# Patient Record
Sex: MALE | Race: WHITE | NOT HISPANIC OR LATINO | Employment: OTHER | URBAN - METROPOLITAN AREA
[De-identification: names, ages, dates, MRNs, and addresses within clinical notes are randomized per-mention and may not be internally consistent; named-entity substitution may affect disease eponyms.]

---

## 2022-04-10 ENCOUNTER — APPOINTMENT (OUTPATIENT)
Dept: CT IMAGING | Facility: HOSPITAL | Age: 27
End: 2022-04-10

## 2022-04-10 ENCOUNTER — HOSPITAL ENCOUNTER (EMERGENCY)
Facility: HOSPITAL | Age: 27
Discharge: HOME | End: 2022-04-10
Attending: EMERGENCY MEDICINE | Admitting: EMERGENCY MEDICINE

## 2022-04-10 VITALS
WEIGHT: 300 LBS | RESPIRATION RATE: 20 BRPM | TEMPERATURE: 97.7 F | SYSTOLIC BLOOD PRESSURE: 158 MMHG | BODY MASS INDEX: 38.5 KG/M2 | DIASTOLIC BLOOD PRESSURE: 94 MMHG | HEART RATE: 105 BPM | OXYGEN SATURATION: 97 % | HEIGHT: 74 IN

## 2022-04-10 DIAGNOSIS — S06.0X0A CONCUSSION WITHOUT LOSS OF CONSCIOUSNESS, INITIAL ENCOUNTER: Primary | ICD-10-CM
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
## 2022-04-10 DIAGNOSIS — M54.2 ACUTE NECK PAIN: ICD-10-CM

## 2022-04-10 DIAGNOSIS — R10.9 ABDOMINAL PAIN, UNSPECIFIED ABDOMINAL LOCATION: ICD-10-CM

## 2022-04-10 DIAGNOSIS — R07.89 CHEST WALL PAIN: ICD-10-CM

## 2022-04-10 PROCEDURE — 72125 CT NECK SPINE W/O DYE: CPT

## 2022-04-10 PROCEDURE — 96374 THER/PROPH/DIAG INJ IV PUSH: CPT

## 2022-04-10 PROCEDURE — 70450 CT HEAD/BRAIN W/O DYE: CPT

## 2022-04-10 PROCEDURE — 74177 CT ABD & PELVIS W/CONTRAST: CPT

## 2022-04-10 PROCEDURE — 71260 CT THORAX DX C+: CPT

## 2022-04-10 PROCEDURE — 25010000002 IOPAMIDOL 61 % SOLUTION: Performed by: EMERGENCY MEDICINE

## 2022-04-10 PROCEDURE — 99283 EMERGENCY DEPT VISIT LOW MDM: CPT

## 2022-04-10 PROCEDURE — 25010000002 KETOROLAC TROMETHAMINE PER 15 MG: Performed by: EMERGENCY MEDICINE

## 2022-04-10 RX ORDER — KETOROLAC TROMETHAMINE 15 MG/ML
15 INJECTION, SOLUTION INTRAMUSCULAR; INTRAVENOUS ONCE
Status: COMPLETED | OUTPATIENT
Start: 2022-04-10 | End: 2022-04-10

## 2022-04-10 RX ORDER — SODIUM CHLORIDE 0.9 % (FLUSH) 0.9 %
10 SYRINGE (ML) INJECTION AS NEEDED
Status: DISCONTINUED | OUTPATIENT
Start: 2022-04-10 | End: 2022-04-10 | Stop reason: HOSPADM

## 2022-04-10 RX ORDER — HYDROCODONE BITARTRATE AND ACETAMINOPHEN 5; 325 MG/1; MG/1
1-2 TABLET ORAL EVERY 6 HOURS PRN
Qty: 12 TABLET | Refills: 0 | Status: SHIPPED | OUTPATIENT
Start: 2022-04-10

## 2022-04-10 RX ORDER — HYDROCODONE BITARTRATE AND ACETAMINOPHEN 5; 325 MG/1; MG/1
1 TABLET ORAL ONCE
Status: COMPLETED | OUTPATIENT
Start: 2022-04-10 | End: 2022-04-10

## 2022-04-10 RX ORDER — CYCLOBENZAPRINE HCL 10 MG
10 TABLET ORAL 3 TIMES DAILY PRN
Qty: 15 TABLET | Refills: 0 | Status: SHIPPED | OUTPATIENT
Start: 2022-04-10

## 2022-04-10 RX ORDER — CYCLOBENZAPRINE HCL 10 MG
10 TABLET ORAL ONCE
Status: COMPLETED | OUTPATIENT
Start: 2022-04-10 | End: 2022-04-10

## 2022-04-10 RX ADMIN — IOPAMIDOL 100 ML: 612 INJECTION, SOLUTION INTRAVENOUS at 16:41

## 2022-04-10 RX ADMIN — CYCLOBENZAPRINE 10 MG: 10 TABLET, FILM COATED ORAL at 19:12

## 2022-04-10 RX ADMIN — HYDROCODONE BITARTRATE AND ACETAMINOPHEN 1 TABLET: 5; 325 TABLET ORAL at 19:12

## 2022-04-10 RX ADMIN — KETOROLAC TROMETHAMINE 15 MG: 15 INJECTION, SOLUTION INTRAMUSCULAR; INTRAVENOUS at 19:12
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
## 2022-04-10 NOTE — DISCHARGE INSTRUCTIONS
Several studies have shown that the combination of ibuprofen and acetaminophen is more effective at pain relief than narcotics.    Take 3 over-the-counter Ibuprofen tablets every 6 hours as needed for pain. Try to take the medication with food. If you develop upper abdominal discomfort, discontinue use.    If not taking another medication which contains Tylenol/acetaminophen, you may also take Tylenol every 6 hours, with a maximum 1,000 mg (two extra strength tablets) per dose.

## 2025-01-01 ENCOUNTER — HOSPITAL ENCOUNTER (EMERGENCY)
Facility: HOSPITAL | Age: 30
Discharge: HOME | End: 2025-01-01
Attending: EMERGENCY MEDICINE
Payer: COMMERCIAL

## 2025-01-01 ENCOUNTER — APPOINTMENT (OUTPATIENT)
Facility: HOSPITAL | Age: 30
End: 2025-01-01
Payer: COMMERCIAL

## 2025-01-01 VITALS
TEMPERATURE: 98.6 F | DIASTOLIC BLOOD PRESSURE: 105 MMHG | WEIGHT: 250 LBS | HEIGHT: 74 IN | OXYGEN SATURATION: 96 % | SYSTOLIC BLOOD PRESSURE: 146 MMHG | RESPIRATION RATE: 20 BRPM | BODY MASS INDEX: 32.08 KG/M2 | HEART RATE: 95 BPM

## 2025-01-01 DIAGNOSIS — B33.8 RSV (RESPIRATORY SYNCYTIAL VIRUS INFECTION): Primary | ICD-10-CM

## 2025-01-01 LAB
FLUAV RNA RESP QL NAA+PROBE: NOT DETECTED
FLUBV RNA RESP QL NAA+PROBE: NOT DETECTED
RSV RNA RESP QL NAA+PROBE: DETECTED
S PYO AG THROAT QL: NEGATIVE
SARS-COV-2 RNA RESP QL NAA+PROBE: NOT DETECTED

## 2025-01-01 PROCEDURE — 87147 CULTURE TYPE IMMUNOLOGIC: CPT | Performed by: PHYSICIAN ASSISTANT

## 2025-01-01 PROCEDURE — 87081 CULTURE SCREEN ONLY: CPT | Performed by: PHYSICIAN ASSISTANT

## 2025-01-01 PROCEDURE — 25810000003 SODIUM CHLORIDE 0.9 % SOLUTION: Performed by: PHYSICIAN ASSISTANT

## 2025-01-01 PROCEDURE — 87637 SARSCOV2&INF A&B&RSV AMP PRB: CPT | Performed by: EMERGENCY MEDICINE

## 2025-01-01 PROCEDURE — 99283 EMERGENCY DEPT VISIT LOW MDM: CPT

## 2025-01-01 PROCEDURE — 87880 STREP A ASSAY W/OPTIC: CPT | Performed by: PHYSICIAN ASSISTANT

## 2025-01-01 PROCEDURE — 71045 X-RAY EXAM CHEST 1 VIEW: CPT

## 2025-01-01 RX ORDER — ACETAMINOPHEN 500 MG
1000 TABLET ORAL ONCE
Status: COMPLETED | OUTPATIENT
Start: 2025-01-01 | End: 2025-01-01

## 2025-01-01 RX ORDER — SODIUM CHLORIDE 0.9 % (FLUSH) 0.9 %
10 SYRINGE (ML) INJECTION AS NEEDED
Status: DISCONTINUED | OUTPATIENT
Start: 2025-01-01 | End: 2025-01-01 | Stop reason: HOSPADM

## 2025-01-01 RX ORDER — BENZONATATE 100 MG/1
100 CAPSULE ORAL ONCE
Status: COMPLETED | OUTPATIENT
Start: 2025-01-01 | End: 2025-01-01

## 2025-01-01 RX ORDER — ALBUTEROL SULFATE 90 UG/1
2 INHALANT RESPIRATORY (INHALATION) EVERY 6 HOURS PRN
Qty: 6.7 G | Refills: 0 | Status: SHIPPED | OUTPATIENT
Start: 2025-01-01

## 2025-01-01 RX ORDER — BENZONATATE 100 MG/1
100 CAPSULE ORAL 3 TIMES DAILY PRN
Qty: 12 CAPSULE | Refills: 0 | Status: SHIPPED | OUTPATIENT
Start: 2025-01-01

## 2025-01-01 RX ADMIN — ACETAMINOPHEN 1000 MG: 500 TABLET ORAL at 18:57

## 2025-01-01 RX ADMIN — SODIUM CHLORIDE 1000 ML: 9 INJECTION, SOLUTION INTRAVENOUS at 18:33

## 2025-01-01 RX ADMIN — BENZONATATE 100 MG: 100 CAPSULE ORAL at 18:58

## 2025-01-02 NOTE — FSED PROVIDER NOTE
[facility]    EMERGENCY DEPARTMENT ENCOUNTER      Pt Name: Jacky Culver  MRN: [id number]  YOB: 1995  Date of evaluation: 1/1/2025  Provider: Krystal Braswell PA-C    CHIEF COMPLAINT       Chief Complaint   Patient presents with    Cough     HISTORY OF PRESENT ILLNESS  (Location/Symptom, Timing/Onset, Context/Setting, Quality, Duration, Modifying Factors, Severity.)   Jacky Culver is a 29 y.o. male who presents to the emergency department with cough, headache, sore throat, and bodyaches x 3 days.  Patient denies any treatment prior to arrival.    Nursing notes were reviewed.  REVIEW OF SYSTEMS    (2-9 systems for level 4, 10 or more for level 5)   Review of Systems   Constitutional:  Negative for chills and fever.   HENT:  Positive for sore throat. Negative for ear pain.    Respiratory:  Positive for cough. Negative for shortness of breath.    Cardiovascular:  Negative for chest pain.   Gastrointestinal:  Negative for abdominal pain, diarrhea, nausea and vomiting.   Genitourinary:  Negative for dysuria and frequency.   Musculoskeletal:  Positive for myalgias.   Neurological:  Positive for headaches. Negative for dizziness.   All other systems reviewed and are negative.    All systems reviewed and negative except for those discussed in HPI.   PAST MEDICAL HISTORY   No past medical history on file.    SURGICAL HISTORY     No past surgical history on file.    CURRENT MEDICATIONS       Current Facility-Administered Medications:     Insert Peripheral IV, , , Once **AND** sodium chloride 0.9 % flush 10 mL, 10 mL, Intravenous, PRN, Krystal Braswell PA-C    Current Outpatient Medications:     albuterol sulfate  (90 Base) MCG/ACT inhaler, Inhale 2 puffs Every 6 (Six) Hours As Needed for Shortness of Air., Disp: 6.7 g, Rfl: 0    benzonatate (TESSALON) 100 MG capsule, Take 1 capsule by mouth 3 (Three) Times a Day As Needed for Cough., Disp: 12 capsule, Rfl: 0    cyclobenzaprine (FLEXERIL) 10 MG tablet,  Take 1 tablet by mouth 3 (Three) Times a Day As Needed for Muscle Spasms., Disp: 15 tablet, Rfl: 0    HYDROcodone-acetaminophen (NORCO) 5-325 MG per tablet, Take 1-2 tablets by mouth Every 6 (Six) Hours As Needed for Severe Pain ., Disp: 12 tablet, Rfl: 0    ALLERGIES     Patient has no known allergies.    FAMILY HISTORY     No family history on file.     SOCIAL HISTORY       Social History     Socioeconomic History    Marital status: Single     PHYSICAL EXAM    (up to 7 for level 4, 8 or more for level 5)   Physical Exam  Vitals and nursing note reviewed.   Constitutional:       Appearance: Normal appearance. He is obese.   HENT:      Head: Normocephalic and atraumatic.   Eyes:      Extraocular Movements: Extraocular movements intact.      Pupils: Pupils are equal, round, and reactive to light.   Cardiovascular:      Rate and Rhythm: Normal rate and regular rhythm.      Pulses: Normal pulses.   Pulmonary:      Effort: Pulmonary effort is normal.      Breath sounds: Normal breath sounds.   Abdominal:      General: Abdomen is flat. Bowel sounds are normal.      Palpations: Abdomen is soft.   Musculoskeletal:         General: Normal range of motion.   Skin:     General: Skin is warm and dry.   Neurological:      General: No focal deficit present.      Mental Status: He is alert and oriented to person, place, and time.   Psychiatric:         Mood and Affect: Mood normal.       DIAGNOSTIC RESULTS     EKG: All EKGs are interpreted by the Emergency Department Physician who either signs or Co-signs this chart in the absence of a cardiologist.    No orders to display     RADIOLOGY:   Non-plain film images such as CT, Ultrasound and MRI are read by the radiologist. Plain radiographic images are visualized and preliminarily interpreted by the emergency physician with the below findings:    [x] Radiologist's Report Reviewed:  XR Chest 1 View   Final Result   Impression:   No active disease.      No active disease.     "  Electronically Signed: Cayetano Mejia MD     1/1/2025 6:07 PM EST     Workstation ID: [id number]        ED BEDSIDE ULTRASOUND:   Performed by ED Physician - none    LABS:    I have reviewed and interpreted all of the currently available lab results from this visit (if applicable):  Results for orders placed or performed during the hospital encounter of 01/01/25   COVID-19, FLU A/B, RSV PCR 1 HR TAT - Swab, Nasopharynx    Collection Time: 01/01/25  5:49 PM    Specimen: Nasopharynx; Swab   Result Value Ref Range    COVID19 Not Detected Not Detected - Ref. Range    Influenza A PCR Not Detected Not Detected    Influenza B PCR Not Detected Not Detected    RSV, PCR Detected (A) Not Detected   Rapid Strep A Screen - Swab, Throat    Collection Time: 01/01/25  7:12 PM    Specimen: Throat; Swab   Result Value Ref Range    Strep A Ag Negative Negative      All other labs were within normal range or not returned as of this dictation.    EMERGENCY DEPARTMENT COURSE and DIFFERENTIAL DIAGNOSIS/MDM:   Vitals:    Vitals:    01/01/25 1746 01/01/25 1838 01/01/25 1914   BP: (!) 146/105     BP Location: Right arm     Patient Position: Sitting     Pulse: (!) 129 100 95   Resp: 20     Temp: 98.6 °F (37 °C)     TempSrc: Oral     SpO2: 94% 95% 96%   Weight: 113 kg (250 lb)     Height: 188 cm (74\")       ED Course as of 01/01/25 1948 Wed Jan 01, 2025   1907 RSV, PCR(!): Detected [WA]      ED Course User Index  [WA] Krystal Braswell, TYLER     MDM    I had a discussion with the patient/family regarding diagnosis, diagnostic results, treatment plan, and medications.  The patient/family indicated understanding of these instructions.  I spent adequate time at the bedside preceding discharge necessary to personally discuss the aftercare instructions, giving patient education, providing explanations of the results of our evaluations/findings, and my decision making to assure that the patient/family understand the plan of care.  Time was allotted to " answer questions at that time and throughout the ED course.  Emphasis was placed on timely follow-up after discharge.  I also discussed the potential for the development of an acute emergent condition requiring further evaluation, admission, or even surgical intervention. I discussed that we found nothing during the visit today indicating the need for further workup, admission, or the presence of an unstable medical condition.  I encouraged the patient to return to the emergency department immediately for ANY concerns, worsening, new complaints, or if symptoms persist and unable to seek follow-up in a timely fashion.  The patient/family expressed understanding and agreement with this plan.  The patient will follow-up with her PCP for reevaluation.     MEDICATIONS ADMINISTERED IN ED:  Medications   sodium chloride 0.9 % flush 10 mL (has no administration in time range)   sodium chloride 0.9 % bolus 1,000 mL (0 mL Intravenous Stopped 1/1/25 1912)   acetaminophen (TYLENOL) tablet 1,000 mg (1,000 mg Oral Given 1/1/25 1857)   benzonatate (TESSALON) capsule 100 mg (100 mg Oral Given 1/1/25 1858)     PROCEDURES:  Procedures:none      CRITICAL CARE TIME    Total Critical Care time was 0 minutes, excluding separately reportable procedures.   There was a high probability of clinically significant/life threatening deterioration in the patient's condition which required my urgent intervention.    FINAL IMPRESSION      1. RSV (respiratory syncytial virus infection)        DISPOSITION/PLAN     ED Disposition       ED Disposition   Discharge    Condition   Stable    Comment   --             PATIENT REFERRED TO:  PATIENT CONNECTION - [facility] [id number] [phone]    As needed    DISCHARGE MEDICATIONS:     Medication List        START taking these medications      albuterol sulfate  (90 Base) MCG/ACT inhaler  Commonly known as: PROVENTIL HFA;VENTOLIN HFA;PROAIR HFA  Inhale 2 puffs Every 6 (Six) Hours As  Needed for Shortness of Air.     benzonatate 100 MG capsule  Commonly known as: TESSALON  Take 1 capsule by mouth 3 (Three) Times a Day As Needed for Cough.            CONTINUE taking these medications      cyclobenzaprine 10 MG tablet  Commonly known as: FLEXERIL  Take 1 tablet by mouth 3 (Three) Times a Day As Needed for Muscle Spasms.     HYDROcodone-acetaminophen 5-325 MG per tablet  Commonly known as: NORCO  Take 1-2 tablets by mouth Every 6 (Six) Hours As Needed for Severe Pain .               Where to Get Your Medications        These medications were sent to [facility]/pharmacy #[id number] - [place], [place] - [address] - [phone]  - [phone]   [address] [facility] [address]      Hours: 24-hours Phone: [phone]   albuterol sulfate  (90 Base) MCG/ACT inhaler  benzonatate 100 MG capsule       Comment: Please note this report has been produced using speech recognition software.    Krystal Braswell PA-C

## 2025-01-04 LAB — BACTERIA SPEC AEROBE CULT: ABNORMAL
